# Patient Record
Sex: FEMALE | Race: ASIAN | Employment: FULL TIME | URBAN - METROPOLITAN AREA
[De-identification: names, ages, dates, MRNs, and addresses within clinical notes are randomized per-mention and may not be internally consistent; named-entity substitution may affect disease eponyms.]

---

## 2020-07-07 ENCOUNTER — TRANSFERRED RECORDS (OUTPATIENT)
Dept: HEALTH INFORMATION MANAGEMENT | Facility: CLINIC | Age: 25
End: 2020-07-07

## 2020-07-07 ENCOUNTER — MEDICAL CORRESPONDENCE (OUTPATIENT)
Dept: HEALTH INFORMATION MANAGEMENT | Facility: CLINIC | Age: 25
End: 2020-07-07

## 2020-07-08 ENCOUNTER — ANCILLARY PROCEDURE (OUTPATIENT)
Dept: CARDIOLOGY | Facility: CLINIC | Age: 25
End: 2020-07-08
Attending: FAMILY MEDICINE
Payer: COMMERCIAL

## 2020-07-08 DIAGNOSIS — R00.2 PALPITATIONS: ICD-10-CM

## 2020-07-08 DIAGNOSIS — R07.9 CHEST PAIN, UNSPECIFIED TYPE: ICD-10-CM

## 2020-07-08 PROCEDURE — 93226 XTRNL ECG REC<48 HR SCAN A/R: CPT | Mod: ZF

## 2020-07-08 PROCEDURE — 93227 XTRNL ECG REC<48 HR R&I: CPT | Mod: ZP | Performed by: INTERNAL MEDICINE

## 2020-07-08 NOTE — PROGRESS NOTES
Per Dr. Darek Salgado, patient to have Holter monitor placed.  Diagnosis: Palpitations R00.2  Monitor placed: Yes  Patient Instructed: Yes  Patient verbalized understanding: Yes  Holter # 3    Placed by: Radha Goddard CMA

## 2020-09-22 ENCOUNTER — TRANSFERRED RECORDS (OUTPATIENT)
Dept: HEALTH INFORMATION MANAGEMENT | Facility: CLINIC | Age: 25
End: 2020-09-22

## 2020-11-19 ENCOUNTER — TRANSFERRED RECORDS (OUTPATIENT)
Dept: HEALTH INFORMATION MANAGEMENT | Facility: CLINIC | Age: 25
End: 2020-11-19

## 2021-01-04 ENCOUNTER — HEALTH MAINTENANCE LETTER (OUTPATIENT)
Age: 26
End: 2021-01-04

## 2021-02-09 ENCOUNTER — TRANSFERRED RECORDS (OUTPATIENT)
Dept: HEALTH INFORMATION MANAGEMENT | Facility: CLINIC | Age: 26
End: 2021-02-09

## 2021-02-23 NOTE — TELEPHONE ENCOUNTER
REFERRAL INFORMATION:    Referring Provider:  N/A    Referring Clinic:  N/A    Reason for Visit/Diagnosis: Bloating     FUTURE VISIT INFORMATION:    Appointment Date: 3/22/2021    Appointment Time: 9 AM      NOTES STATUS DETAILS   OFFICE NOTE from Referring Provider N/A    OFFICE NOTE from Other Specialist Received 2/9/2021 Office visit with Dr. Amy Oswald ([facility])     11/19/2020 Office visit with Dr. Angelica Brannon ([facility])     6/25/2020 Office visit with Dr. Angelica Andrew ([facility])    HOSPITAL DISCHARGE SUMMARY/  ED VISITS N/A    OPERATIVE REPORT N/A    MEDICATION LIST N/A         ENDOSCOPY  N/A    COLONOSCOPY N/A    ERCP N/A    EUS N/A    STOOL TESTING Received  9/22/2020 ([facility])    PERTINENT LABS Received     PATHOLOGY REPORTS (RELATED) N/A    IMAGING (CT, MRI, EGD, MRCP, Small Bowel Follow Through/SBT, MR/CT Enterography) N/A      3/11/2021 3:07pm Called Pt, no answer. Unable to LVM because voicemail box has not been set up yet. Fax request sent to [employer] ([phone]) for med recs relating to Dx. -Lauren     3/11/2021 3:57pm Received recs from [facility]; sent to scan. Krystal

## 2021-03-22 ENCOUNTER — PRE VISIT (OUTPATIENT)
Dept: GASTROENTEROLOGY | Facility: CLINIC | Age: 26
End: 2021-03-22

## 2021-08-09 ENCOUNTER — IMMUNIZATION (OUTPATIENT)
Dept: NURSING | Facility: CLINIC | Age: 26
End: 2021-08-09
Payer: COMMERCIAL

## 2021-08-09 PROCEDURE — 0001A PR COVID VAC PFIZER DIL RECON 30 MCG/0.3 ML IM: CPT | Performed by: FAMILY MEDICINE

## 2021-08-09 PROCEDURE — 91300 PR COVID VAC PFIZER DIL RECON 30 MCG/0.3 ML IM: CPT | Performed by: FAMILY MEDICINE

## 2021-08-30 ENCOUNTER — IMMUNIZATION (OUTPATIENT)
Dept: NURSING | Facility: CLINIC | Age: 26
End: 2021-08-30
Attending: FAMILY MEDICINE
Payer: COMMERCIAL

## 2021-08-30 PROCEDURE — 0002A PR COVID VAC PFIZER DIL RECON 30 MCG/0.3 ML IM: CPT | Performed by: FAMILY MEDICINE

## 2021-08-30 PROCEDURE — 91300 PR COVID VAC PFIZER DIL RECON 30 MCG/0.3 ML IM: CPT | Performed by: FAMILY MEDICINE

## 2021-10-11 ENCOUNTER — HEALTH MAINTENANCE LETTER (OUTPATIENT)
Age: 26
End: 2021-10-11

## 2022-01-30 ENCOUNTER — HEALTH MAINTENANCE LETTER (OUTPATIENT)
Age: 27
End: 2022-01-30

## 2022-09-24 ENCOUNTER — HEALTH MAINTENANCE LETTER (OUTPATIENT)
Age: 27
End: 2022-09-24

## 2023-05-08 ENCOUNTER — HEALTH MAINTENANCE LETTER (OUTPATIENT)
Age: 28
End: 2023-05-08

## 2024-02-16 ENCOUNTER — TRANSCRIBE ORDERS (OUTPATIENT)
Dept: OTHER | Age: 29
End: 2024-02-16

## 2024-02-16 DIAGNOSIS — H01.009 BLEPHARITIS: Primary | ICD-10-CM

## 2024-05-09 ENCOUNTER — TRANSFERRED RECORDS (OUTPATIENT)
Dept: HEALTH INFORMATION MANAGEMENT | Facility: CLINIC | Age: 29
End: 2024-05-09

## 2024-06-13 ENCOUNTER — TRANSFERRED RECORDS (OUTPATIENT)
Dept: HEALTH INFORMATION MANAGEMENT | Facility: CLINIC | Age: 29
End: 2024-06-13

## 2024-07-14 ENCOUNTER — HEALTH MAINTENANCE LETTER (OUTPATIENT)
Age: 29
End: 2024-07-14

## 2024-08-01 ENCOUNTER — TRANSFERRED RECORDS (OUTPATIENT)
Dept: HEALTH INFORMATION MANAGEMENT | Facility: CLINIC | Age: 29
End: 2024-08-01
Payer: COMMERCIAL

## 2024-08-02 ENCOUNTER — MEDICAL CORRESPONDENCE (OUTPATIENT)
Dept: HEALTH INFORMATION MANAGEMENT | Facility: CLINIC | Age: 29
End: 2024-08-02
Payer: COMMERCIAL

## 2024-08-05 ENCOUNTER — TRANSCRIBE ORDERS (OUTPATIENT)
Dept: OTHER | Age: 29
End: 2024-08-05

## 2024-08-05 DIAGNOSIS — H01.139 ECZEMATOUS DERMATITIS OF EYELID: Primary | ICD-10-CM

## 2024-09-30 ENCOUNTER — OFFICE VISIT (OUTPATIENT)
Dept: DERMATOLOGY | Facility: CLINIC | Age: 29
End: 2024-09-30
Payer: COMMERCIAL

## 2024-09-30 DIAGNOSIS — H01.119 CONTACT DERMATITIS OF EYELID, UNSPECIFIED LATERALITY: Primary | ICD-10-CM

## 2024-09-30 DIAGNOSIS — L20.84 INTRINSIC ATOPIC DERMATITIS: ICD-10-CM

## 2024-09-30 RX ORDER — TACROLIMUS 1 MG/G
OINTMENT TOPICAL 2 TIMES DAILY
Qty: 60 G | Refills: 11 | Status: SHIPPED | OUTPATIENT
Start: 2024-09-30

## 2024-09-30 NOTE — NURSING NOTE
Jorge Leblanc's goals for this visit include:   Chief Complaint   Patient presents with    Rash     Pt has rash on eyelids, has been about a year, light steroid use has help. Did use oral steroid as well.        She requests these members of her care team be copied on today's visit information:     PCP: No Ref-Primary, Physician    Referring Provider:  [place] [address]    There were no vitals taken for this visit.    Do you need any medication refills at today's visit?     Niesha Gonzalez LPN on 9/30/2024 at 9:17 AM

## 2024-09-30 NOTE — PROGRESS NOTES
[facility] Dermatology Note    Encounter Date: Sep 30, 2024    Dermatology Problem List:  1.  Eyelid dermatitis  - current tx: tacrolimus  - referral to Dr. Ennis    ______________________________________    Impression/Plan:  1. Eyelid dermatitis: contact vs atopic; wears acrylic nails  - Referral to Dr. Ennis for allergy patch testing  - discussed risk/benefits of treatment: (topicals vs oral medication vs dupilumab)  - start antihistamine daily (allegra or fexofenadine)  - start tacrolimus ointment-apply twice a day  - in future may consider dupilumab vs oral mediations, if no improvement with topicals    Follow-up in 4 months.       Staff Involved:  Staff and Scribe    Scribe Disclosure:   I, Camilla Pro, am serving as a scribe; to document services personally performed by Dipak Murray MD -based on data collection and the provider's statements to me.     Provider Disclosure:   The documentation recorded by the scribe accurately reflects the services I personally performed and the decisions made by me.    Dipak Murray MD   of Dermatology  Department of Dermatology  [facility] [employer] [facility]        CC:   Chief Complaint   Patient presents with    Rash     Pt has rash on eyelids, has been about a year, light steroid use has help. Did use oral steroid as well.        History of Present Illness:  Ms. Jorge Leblanc is a 28 year old female who presents as a new patient.    Here for rash on eyelids which has been present for 1 year that comes and goes. It started out as swelling and then some scaling flakes. She has tried a light steroid, that has helped a little. She is wearing acrylic nails and she does wear makeup, which she feels may not cause it to get worse.     Labs:  N/a    Physical exam:  Vitals: There were no vitals taken for this visit.  GEN: This is a well developed, well-nourished female in no acute distress, in a pleasant mood.     SKIN: العراقي phototype II  - Focused examination of the face was performed.  - faint erythema of the lateral canthi   - No other lesions of concern on areas examined.     Past Medical History:   No past medical history on file.  No past surgical history on file.    Social History:   reports that she has never smoked. She has never used smokeless tobacco.    Family History:  No family history on file.    Medications:  Current Outpatient Medications   Medication Sig Dispense Refill    TOBRADEX 0.3-0.1 % ophthalmic ointment Place 1 Application into both eyes.      omeprazole (PRILOSEC) 20 MG DR capsule TAKE ONE Capsule BY MOUTH EVERY DAY BEFORE a meal       Allergies   Allergen Reactions    Septra [Sulfamethoxazole-Trimethoprim]

## 2024-09-30 NOTE — LETTER
9/30/2024      Jorge Leblanc  [address]  [place] [address]      Dear Colleague,    Thank you for referring your patient, Jorge Leblanc, to the [facility]. Please see a copy of my visit note below.    [facility] Dermatology Note    Encounter Date: Sep 30, 2024    Dermatology Problem List:  1.  Eyelid dermatitis  - current tx: tacrolimus  - referral to Dr. Ennis    ______________________________________    Impression/Plan:  1. Eyelid dermatitis: contact vs atopic; wears acrylic nails  - Referral to Dr. Ennis for allergy patch testing  - discussed risk/benefits of treatment: (topicals vs oral medication vs dupilumab)  - start antihistamine daily (allegra or fexofenadine)  - start tacrolimus ointment-apply twice a day  - in future may consider dupilumab vs oral mediations, if no improvement with topicals    Follow-up in 4 months.       Staff Involved:  Staff and Scribe    Scribe Disclosure:   I, Camilla Pro, am serving as a scribe; to document services personally performed by Dipak Murray MD -based on data collection and the provider's statements to me.     Provider Disclosure:   The documentation recorded by the scribe accurately reflects the services I personally performed and the decisions made by me.    Dipak Murray MD   of Dermatology  Department of Dermatology  [facility] [employer] [facility]        CC:   Chief Complaint   Patient presents with     Rash     Pt has rash on eyelids, has been about a year, light steroid use has help. Did use oral steroid as well.        History of Present Illness:  Ms. Jorge Leblanc is a 28 year old female who presents as a new patient.    Here for rash on eyelids which has been present for 1 year that comes and goes. It started out as swelling and then some scaling flakes. She has tried a light steroid, that has helped a little. She is wearing acrylic nails and she does wear makeup,  which she feels may not cause it to get worse.     Labs:  N/a    Physical exam:  Vitals: There were no vitals taken for this visit.  GEN: This is a well developed, well-nourished female in no acute distress, in a pleasant mood.    SKIN: العراقي phototype II  - Focused examination of the face was performed.  - faint erythema of the lateral canthi   - No other lesions of concern on areas examined.     Past Medical History:   No past medical history on file.  No past surgical history on file.    Social History:   reports that she has never smoked. She has never used smokeless tobacco.    Family History:  No family history on file.    Medications:  Current Outpatient Medications   Medication Sig Dispense Refill     TOBRADEX 0.3-0.1 % ophthalmic ointment Place 1 Application into both eyes.       omeprazole (PRILOSEC) 20 MG DR capsule TAKE ONE Capsule BY MOUTH EVERY DAY BEFORE a meal       Allergies   Allergen Reactions     Septra [Sulfamethoxazole-Trimethoprim]                     Again, thank you for allowing me to participate in the care of your patient.        Sincerely,        Dipak Murray MD

## 2024-12-26 NOTE — TELEPHONE ENCOUNTER
FUTURE VISIT INFORMATION      FUTURE VISIT INFORMATION:  Date: 3/19/25  Time: 10a  Location: [facility] [place] [place]  REFERRAL INFORMATION:  Referring provider:  Mj Deleon PA   Referring providers clinic:  [facility]   Reason for visit/diagnosis:  H01.139 (ICD-10-CM) - Eczematous dermatitis of eyelid     RECORDS REQUESTED FROM:       Clinic name Comments Records Status   [facility] 9/30/24 - Trevor MAYEN   [facility] 8/1/24 - Transferred Records, Outside  6/13/24 - Transferred Records, Outside Media   [facility] 5/9/24 - Transferred Records, Outside Media

## 2025-03-19 ENCOUNTER — OFFICE VISIT (OUTPATIENT)
Dept: ALLERGY | Facility: CLINIC | Age: 30
End: 2025-03-19
Payer: COMMERCIAL

## 2025-03-19 ENCOUNTER — PRE VISIT (OUTPATIENT)
Dept: ALLERGY | Facility: CLINIC | Age: 30
End: 2025-03-19

## 2025-03-19 DIAGNOSIS — Z88.2 ALLERGY TO SULFA DRUGS: ICD-10-CM

## 2025-03-19 DIAGNOSIS — R09.82 ALLERGIC RHINITIS WITH POSTNASAL DRIP: ICD-10-CM

## 2025-03-19 DIAGNOSIS — J30.81 AIRBORNE CAT ALLERGY: ICD-10-CM

## 2025-03-19 DIAGNOSIS — H01.119 EYELID DERMATITIS, ALLERGIC/CONTACT: ICD-10-CM

## 2025-03-19 DIAGNOSIS — Z88.9 DRUG ALLERGY: ICD-10-CM

## 2025-03-19 DIAGNOSIS — Z91.09 HOUSE DUST MITE ALLERGY: ICD-10-CM

## 2025-03-19 DIAGNOSIS — J30.9 ALLERGIC RHINITIS WITH POSTNASAL DRIP: ICD-10-CM

## 2025-03-19 DIAGNOSIS — L23.5 ALLERGIC DERMATITIS DUE TO OTHER CHEMICAL PRODUCT: ICD-10-CM

## 2025-03-19 DIAGNOSIS — L20.89 OTHER ATOPIC DERMATITIS: Primary | ICD-10-CM

## 2025-03-19 RX ORDER — PIMECROLIMUS 10 MG/G
CREAM TOPICAL 2 TIMES DAILY PRN
Qty: 30 G | Refills: 3 | Status: SHIPPED | OUTPATIENT
Start: 2025-03-19

## 2025-03-19 NOTE — PATIENT INSTRUCTIONS
You can always access your most recent office visit note with this allergy clinic via [facility]'s MyChart, which contains all of your results from testing performed by Dr. Ennis along with the details of the discussed treatment plan.  If you do not have access to MyChart, please discuss with a [facility] staff member. Additionally, if your provider is within [facility] or their EMR participates in the [facility]) network, they can also access this information.     ____________________________________________     House Dust Mite Allergy        The house dust mite is an arachnid about 0.3 mm in size and not visible to the naked eye. There are around 150 species of house dust mites in the world. One mite produces up to 40 fecal droppings a day. One teaspoonful of bedroom dust contains an average of nearly 1000 mites and 250,000 minute droppings.    Causes and triggers of house dust mite allergy  The house dust mite requires a warm, moist environment without light in order to live and reproduce. Our beds are ideal. The mite feeds on human and animal skin scales. The allergen is mainly contained in the mite's feces. The feces contain allergy-triggering constituents which are spread in fine dust, are breathed in and can cause an allergic reaction.    Symptoms  When the allergens come into contact with the mucous membranes in the eyes, nose, mouth and throat, sufferers develop symptoms typical of an allergic cold (allergic rhinitis) or an allergic inflammation of the conjunctiva (allergic conjunctivitis): blocked or runny nose, sneezing, red, itchy eyes. If all of these symptoms are present, then the condition is also known as rhinoconjunctivitis. Often, the upper respiratory tract becomes chronically inflamed, primarily because house dust mites are present all year round.  The symptoms of house dust mite allergy typically occur in the morning and are more frequent in the cold months of the year.    Therapy and  "treatment  As a first step, mattress, pillows and duvet/comforter should be placed in mite-proof or anti-mite covers, sometimes known as encasings. Alternatively you can use pillows or comforter that can be washed at over 130 F monthly. At the same time, house dust should be minimized. If necessary, the symptoms can be treated with medication, for example antihistamines in the form of nasal sprays, eye drops and tablets. Desensitization/specific immunotherapy (SIT) is recommended for house dust allergy if all the measures above are not sufficient.    Tips and tricks:  Keep room temperature at 66-70 F and relative air humidity at a maximum of 50%.  Ideally, thoroughly air your home two to three times a day for 5 to 10 minutes each time.  Wash bed linens in at least 130 F every week.  Remove stuffed animals or freeze them every other week.  Keep ceiling fans off in the bedroom as they can stir up dust mite allergens.  Remove dust from furniture with a damp cloth and regularly wet mop floors.  Do not put pot and hydroponic plants in the bedroom and also avoid putting too many in living areas, as they increase room humidity.  When staying overnight in other accommodations, we recommend taking your own bed linen and the above anti-mite mattress covers with you.  Remove upholstered furniture from the bedroom and consider removing the carpets. Ideally, use sealed parquet or laminate rossana, cork tiles or rossana made of wood, novilon or PVC.  Maybe additionally reduce dust mites in mattress, upholstery, or rossana using hot steam .      Modified from \"House Dust Mite Allergy\" by [employer] [facility] [place].   "

## 2025-03-19 NOTE — LETTER
3/19/2025      Jorge Leblanc  [address]  [facility] [address]      Dear Colleague,    Thank you for referring your patient, Jorge Leblanc, to the [facility] [place]. Please see a copy of my visit note below.    [facility] Dermato-allergology Note  Office visit  Encounter Date: Mar 19, 2025  ____________________________________________    CC: No chief complaint on file.      HPI:  (Mar 19, 2025)  Ms. Jorge Leblanc is a(n) 29 year old female who presents today as new patient for allergy consultation  - Referred by Mj ERICKSON ([facility]) on 8/5/2024 for eczematous dermatitis of eyelid   - Scanned [facility] records show drug allergy: Septra - rash, and pertinent MHx of anxiety  - No prior allergy records in [facility]  - 5/9/2024: visit with Dr. Angelica Franco ([facility])  FROM HPI:   Patient states that a new rash occurred 6 months ago. First starting around her left eye and then it started around her right eye. The rash consists of swelling, flaking, red, and had slightly yellow discharge. Patient saw her primary care physician in January 2024. During this appointment she was prescribed an antibiotic ointment which did not work. She saw her opthalmologist in February 2024 who prescribed TobraDex ointment three times a day for two weeks. This helped but never resolved the rash. The rash was then treated with a 5 day Prednisone taper in March 2024 which helped but the rash then recurred. She notices that products with salicylic acid make it worse. She will be traveling on vacation to [place] in one weeks, for 2 weeks.  The patient presents today for further evaluation and treatment.  FROM A&P:  1. Contact Dermatitis Other - The patient is taking a trip to [place] in one week and will be gone for 2 weeks. She will treat her eyelid dermatitis with the Desonide ointment starting in one week, and will follow up in 3 weeks from  today. We discussed that traveling and flying could make her dermatitis worse. We also discussed that environmental factors will cause her dermatitis to flare and many of these things are difficult to avoid. If this looks like it will be ongoing for a while, consider topical non- steroidal ointments. We will not initiate treatment for her acne until the eyelid dermatitis has cleared, as topical acne treatments can precipitate the dermatitis.  (L23.89)  Red, swollen, erythematous patchs  distributed on the eyelids.  Status: Inadequately Controlled    Plan: Treatment Regimen.  Begin the following treatment(s): Apply Desonide ointment TID for 7 days all the way around both eyes.  Discontinue the following treatment(s): Eyeshadow with shimmer, retinol, retin a, Tretinoin, salicylic acid, benzoyl peroxide, anything with anti-aging, sunscreen, blowing wind, flying on an airplane, and lash growth products.    Plan: Prescription.  desonide 0.05% topical ointment TP  Sig: Apply to inferior and superior eyelids TID for 7 days, then Dc.  Quantity: 15 Gram    Plan: Counseling.  I counseled the patient regarding the following:  Skin care: Patient should use hypoallergenic products such as unscented soaps. Eliminate exposure to all new cosmetics, fragrances, hair products, nail products shampoos, scented soaps, plants, metals and sunscreens.  Expectations: Contact dermatitis can persist for several weeks before it fully resolves. Sometimes, patch testing is necessary if reactions persist or if the patient is in contact with several potential allergens.  Contact office if: Contact dermatitis worsens or fails to improve despite several weeks of treatment.  - 8/1/24: Visit with referring provider (most recent [facility] visit in [facility])      - 9/30/24: Dr. Dipak Murray (derm) - initial visit with [facility] derm   FROM [facility]:   Here for rash on eyelids which has been present for 1 year that comes and goes. It started out as  swelling and then some scaling flakes. She has tried a light steroid, that has helped a little. She is wearing acrylic nails and she does wear makeup, which she feels may not cause it to get worse.     FROM PE:   Vitals: There were no vitals taken for this visit.  GEN: This is a well developed, well-nourished female in no acute distress, in a pleasant mood.    SKIN: العراقي phototype II  - Focused examination of the face was performed.  - faint erythema of the lateral canthi   - No other lesions of concern on areas examined.     FROM A&P:  Impression/Plan:  1. Eyelid dermatitis: contact vs atopic; wears acrylic nails  - Referral to Dr. Ennis for allergy patch testing  - discussed risk/benefits of treatment: (topicals vs oral medication vs dupilumab)  - start antihistamine daily (allegra or fexofenadine)  - start tacrolimus ointment-apply twice a day  - in future may consider dupilumab vs oral mediations, if no improvement with topicals     Follow-up in 4 months.   - She did dye her hair in January   - Otherwise feeling well in usual state of health    Physical Exam:  General: In no acute distress, well-developed, well-nourished  Eyes: no conjunctivitis  ENT: no signs of rhinitis   Pulmonary: no wheezing or coughing  Skin: Focused examination of the skin on test sites was performed = see test results below  - No active eczematous lesions on visible skin, including eyelids and face     Past Medical History:   There is no problem list on file for this patient.    No past medical history on file.    Allergies:  Allergies   Allergen Reactions     Septra [Sulfamethoxazole-Trimethoprim]        Medications:  Current Outpatient Medications   Medication Sig Dispense Refill     omeprazole (PRILOSEC) 20 MG DR capsule TAKE ONE Capsule BY MOUTH EVERY DAY BEFORE a meal       tacrolimus (PROTOPIC) 0.1 % external ointment Apply topically 2 times daily. 60 g 11     TOBRADEX 0.3-0.1 % ophthalmic ointment Place 1 Application  into both eyes.       No current facility-administered medications for this visit.       Previous Labs, Allergy Tests, Dermatopathology, Imaging:  See HPI    Referred By: DRE Flannery  [place] [facility]  [address]  [place] [address]     Allergy Tests:  Past Allergy Test    Family History:  No family history on file.    Social History:  The patient works from home, minimal exposure to dust.   Patient has the following hobbies or non-occupational exposure: .    Order for Future Allergy Testing:    [x] Outpatient  [] Inpatient: [facility]..../ Bed ...    Skin Atopy (atopic dermatitis)? [x] Yes     [] No  Comments:   - recurrent dermatitis of right eye, usually at the start of late fall   - first noticed in October 2023  - was worse last year September 2024  - does not worsen in the winter   - eczema on shins but otherwise none  - started treating area with TobraDex in April 2024    Childhood eczema?   [] Yes     [x] No  Comments:   Hand eczema?   [] Yes     [x] No  If yes, leading hand? [] Right     [] Left     [] Ambidextrous  Comments:   - goes to the nail salon every few weeks    Contact allergies?     Comments:   Including adhesives/bandages? [] Yes     [x] No  Comments:   Including metals?   [] Yes     [x] No  Comments:   Other substances?   [] Yes     [x] No  Comments:     Drug allergies?   [x] Yes     [] No  Comments:   -  after first dose of Septra              Angioedema?   [] Yes     [x] No  Comments:     Urticaria?   [] Yes     [x] No  Comments:     Food allergies?   [] Yes     [x] No  Comments:     Pet allergies?   [] Yes     [x] No  Comments:     Environmental allergy symptoms?  [] Conjunctivitis  [] Otitis  [] Pharyngitis  [] Polyposis  [x] Postnasal Drip  [x] Rhinitis  [] Sinusitis  [] None  Comments:   - runny nose, sneezing  - can happen at anytime during the year  - started within 1-4 weeks of moving to [place] from [place]  - not currently taking medications    TRIIN  Operations?  [] Adenoids [] Septum [] Sinus  [] Tonsils        [] Other:   [x] None  Comments:     Pulmonary symptoms (from birth to present)?  [] Asthma bronchiale  Inhaler(s)?:   [] Coughing  [] Other  [x] None  Comments:     Environmental and pulmonary symptoms aggravated by?  Season: [] None     [] I     [] II     [] III     [] IV     [] V     [] VI          [] VII     [] VIII     [] IX     [] X     [] XI     [] XII     [x] Perennial  Time of Day: [] None     [] Morning     [] Noon     [] Evening     [] Night     [] Whole Day  Location/Changes: [] None     [] Inside     [] Outside     [] Mountain     [] Sea     [] Other:   Triggers (specific): [] None     [] Animals     [] Dust     [] Mold     [] Plants     [] Other:   Triggers (other): [] None     [] Psyche     [] Sport     [] Work     [] Other:   Irritant: [] None     [] Cold     [] Heat     [] Odors     [] Physical Efforts     [] Smoke     [] Other:     Order for PATCH TESTS  Reason for tests (suspected allergy): not necessary at the moment  Known previous allergies: see questionnaire and HPI  Standardized panels  [x] Standard panel (40 tests)  [x] Preservatives & Antimicrobials (31 tests)  [x] Emulsifiers & Additives (25 tests)   [x] Perfumes/Flavours & Plants (25 tests)  [x] Hairdresser panel (12 tests)  [] Rubber Chemicals (22 tests)  [x] Plastics (26 tests)  [] Colorants/Dyes/Food additives (20 tests)  [] Metals (implants/dental) (24 tests)  [] Local anaesthetics/NSAIDs (13 tests)  [x] Antibiotics & Antimycotics (14 tests)   [] Corticosteroids (15 tests)   [] Photopatch test (62 tests)   [] Others:     [] Patient's Own Products:   DO NOT test if chemical or biological identity is unknown!   always ask from patient the product information and safety sheets (MSDS)       Order for PRICK TESTS    Reason for tests (suspected allergy): atopy?  Known previous allergies: none confirmed    Standardized prick panels  [x] Atopic panel (20 tests)  [] Pediatric Panel  (12 tests)  [] Milk, Meat, Eggs, Grains (20 tests)   [] Dust, Epithelia, Feathers (10 tests)  [] Fish, Seafood, Shellfish (17 tests)  [] Nuts, Beans (14 tests)  [] Spice, Vegetable, Fruit (17 tests)  [] Pollen Panel = Tree, Grass, Weed (24 tests)  [] Others:     [] Patient's Own Products:   DO NOT test if chemical or biological identity is unknown!   always ask from patient the product information and safety sheets (MSDS)     Standardized intradermal tests  [x] Alternaria alternata  [x] Aspergillus fumigatus  [x] Cladosporium herbarum   [x] Penicillium notatum  [x] Dermatophagoides farinae  [x] Dermatophagoides pteronyssinus  [] Dog Epithelium  [] Cat Epithelium  [] Others:     [] Bee venom   [] Wasp venom  !!Specific protocol with dilutions!!       Order for Drug allergy tests (prick & intradermal & patch tests)    [] Penicillin G     [] Ampicillin   [] Cefazolin        [] Ceftriaxone     [] Ceftazidime     [] Cefepime     [] Cefuroxime  [] Bactrim  [] Iodixanol     [] Iopamidol        [] Iohexol  [] Others:   [] Patient's Own:   Order for  as test date    ____________________________________________    Atopy Screen (Placed Mar 19, 2025)  No Substance Readings (15 min) Evaluation   POS Histamine 1mg/ml ++    NEG NaCl 0.9% -      No Substance Readings (15 min) Evaluation   1 Alternaria alternata (tenuis)  -    2 Cladosporium herbarum -    3 Aspergillus fumigatus -    4 Penicillium notatum -    5 Dermatophagoides pteronyssinus ++++    6 Dermatophagoides farinae ++++    7 Dog epithelium (canis spp) -    8 Cat hair (rupert catus) +    9 Cockroach   (Blatella americana & germanica) -    10 Grass mix midwest   (Lorraine, Orchard, Redtop, Aric) -    11 Surendra grass (sorghum halepense) -    12 Weed mix   (common Cocklebur, Lamb s quarters, rough redroot Pigweed, Dock/Sorrel) -    13 Mug wort (artemisia vulgare) -    14 Ragweed giant/short (ambrosia spp) -    15 White birch (Betula papyrifera) -    16 Tree mix 1 (Pecan,  Maple BHR, Grandin RVW, american [place], black Chilo) -    17 Red cedar (juniperus virginia) -    18 Tree mix 2   (white Gutierrez, river/red Birch, black [place], common [place], american Elm) -    19 Box elder/Maple mix (acer spp) -    20 Scott shagbark (carya ovata) -           Conclusion      Intradermal Testing (Placed Mar 19, 2025)  No Substance Conc.  Reading (15min)  immediate Papule [mm] / Erythema [mm] Reading   (... days)  delayed Papule [mm] / Erythema [mm] Remarks   A Aspergillus fumigatus  1:10 -   -    P Penicillium notatum 1:10 -   -     Alternaria Alternata 1:10 -   -     Aspergillus fumigatus 1:10 -   -    Conclusions: No signs for immediate-type reaction. Over the next 2 and 4 days, we will check for delayed-type reactions..    ________________________________    Assessment & Plan:    ==> Final Diagnosis:     # Recurrent eyelid dermatitis (only right) in fall in [place]  Atopic dermatitis/reaction to snow molds?  Allergic contact dermatitis?  * chronic illness with exacerbation, progression, side effects from treatment    #Suspicion for atopic predisposition with:  Perennial PND and rhinitis   Seasonal eyelid atopic dermatitis?  * chronic illness with exacerbation, progression, side effects from treatment     # Ruling out immediate-type reaction to Bactrim  * acute illness with systemic symptoms      These conclusions are made at the best of one's knowledge and belief based on the provided evidence such as patient's history and allergy test results and they can change over time or can be incomplete because of missing information.    ==> Treatment Plan:    Reduce DM  Prescribed Elidel cream for dermatitis around eyes, use twice a day when she has it  Zyrtec or Claritin for nasal congestion, at bedtime      Procedures Performed: Allergy tests, including prick and intradermal     Staff Involved: Provider, Staff, Medical Student, and Scribe    Scribe Disclosure:   Tammie BRITO, am serving as a  scribe to document services personally performed by Mane Ennis MD based on data collection and the provider's statements to me.     Staff Physician Comments:  I was present with the scribe who participated in the documentation of the note. I have verified the history and personally performed the physical exam and medical decision making. I agree with the assessment and plan as documented in the note. I have reviewed and if necessary amended the note.      Also, I was present with the medical student who participated in the service and in the documentation of the note. I have verified the history and personally performed the physical exam and medical decision making. I agree with the assessment and plan as documented in the note. I have reviewed and if necessary amended the note.    Mane Ennis MD  Professor  Head of Dermato-Allergy Division  Department of Dermatology  [facility]     Follow-up in Derm-Allergy clinic for patch tests if necessary    I spent a total of 40 minutes with Jorge Deana. This time was spent counseling the patient and/or coordinating care, explaining the allergy tests, performing allergy tests and assessing the clinical relevance.       Again, thank you for allowing me to participate in the care of your patient.        Sincerely,        Mane Ennis MD    Electronically signed

## 2025-03-19 NOTE — PROGRESS NOTES
[facility] Dermato-allergology Note  Office visit  Encounter Date: Mar 19, 2025  ____________________________________________    CC: No chief complaint on file.      HPI:  (Mar 19, 2025)  Ms. Jorge Leblanc is a(n) 29 year old female who presents today as new patient for allergy consultation  - Referred by Mj ERICKSON ([facility]) on 8/5/2024 for eczematous dermatitis of eyelid   - Scanned [facility] records show drug allergy: Septra - rash, and pertinent MHx of anxiety  - No prior allergy records in [facility]  - 5/9/2024: visit with Dr. Angelica Franco ([facility])  FROM HPI:   Patient states that a new rash occurred 6 months ago. First starting around her left eye and then it started around her right eye. The rash consists of swelling, flaking, red, and had slightly yellow discharge. Patient saw her primary care physician in January 2024. During this appointment she was prescribed an antibiotic ointment which did not work. She saw her opthalmologist in February 2024 who prescribed TobraDex ointment three times a day for two weeks. This helped but never resolved the rash. The rash was then treated with a 5 day Prednisone taper in March 2024 which helped but the rash then recurred. She notices that products with salicylic acid make it worse. She will be traveling on vacation to [facility] in one weeks, for 2 weeks.  The patient presents today for further evaluation and treatment.  FROM A&P:  1. Contact Dermatitis Other - The patient is taking a trip to [facility] in one week and will be gone for 2 weeks. She will treat her eyelid dermatitis with the Desonide ointment starting in one week, and will follow up in 3 weeks from today. We discussed that traveling and flying could make her dermatitis worse. We also discussed that environmental factors will cause her dermatitis to flare and many of these things are difficult to avoid. If this looks like it will be  ongoing for a while, consider topical non- steroidal ointments. We will not initiate treatment for her acne until the eyelid dermatitis has cleared, as topical acne treatments can precipitate the dermatitis.  (L23.89)  Red, swollen, erythematous patchs  distributed on the eyelids.  Status: Inadequately Controlled    Plan: Treatment Regimen.  Begin the following treatment(s): Apply Desonide ointment TID for 7 days all the way around both eyes.  Discontinue the following treatment(s): Eyeshadow with shimmer, retinol, retin a, Tretinoin, salicylic acid, benzoyl peroxide, anything with anti-aging, sunscreen, blowing wind, flying on an airplane, and lash growth products.    Plan: Prescription.  desonide 0.05% topical ointment TP  Sig: Apply to inferior and superior eyelids TID for 7 days, then Dc.  Quantity: 15 Gram    Plan: Counseling.  I counseled the patient regarding the following:  Skin care: Patient should use hypoallergenic products such as unscented soaps. Eliminate exposure to all new cosmetics, fragrances, hair products, nail products shampoos, scented soaps, plants, metals and sunscreens.  Expectations: Contact dermatitis can persist for several weeks before it fully resolves. Sometimes, patch testing is necessary if reactions persist or if the patient is in contact with several potential allergens.  Contact office if: Contact dermatitis worsens or fails to improve despite several weeks of treatment.  - 8/1/24: Visit with referring provider (most recent [facility] visit in [facility])      - 9/30/24: Dr. Dipak Murray (derm) - initial visit with [facility] derm   FROM [facility]:   Here for rash on eyelids which has been present for 1 year that comes and goes. It started out as swelling and then some scaling flakes. She has tried a light steroid, that has helped a little. She is wearing acrylic nails and she does wear makeup, which she feels may not cause it to get worse.     FROM PE:   Vitals: There were no vitals  taken for this visit.  GEN: This is a well developed, well-nourished female in no acute distress, in a pleasant mood.    SKIN: العراقي phototype II  - Focused examination of the face was performed.  - faint erythema of the lateral canthi   - No other lesions of concern on areas examined.     FROM A&P:  Impression/Plan:  1. Eyelid dermatitis: contact vs atopic; wears acrylic nails  - Referral to Dr. Ennis for allergy patch testing  - discussed risk/benefits of treatment: (topicals vs oral medication vs dupilumab)  - start antihistamine daily (allegra or fexofenadine)  - start tacrolimus ointment-apply twice a day  - in future may consider dupilumab vs oral mediations, if no improvement with topicals     Follow-up in 4 months.   - She did dye her hair in January   - Otherwise feeling well in usual state of health    Physical Exam:  General: In no acute distress, well-developed, well-nourished  Eyes: no conjunctivitis  ENT: no signs of rhinitis   Pulmonary: no wheezing or coughing  Skin: Focused examination of the skin on test sites was performed = see test results below  - No active eczematous lesions on visible skin, including eyelids and face     Past Medical History:   There is no problem list on file for this patient.    No past medical history on file.    Allergies:  Allergies   Allergen Reactions    Septra [Sulfamethoxazole-Trimethoprim]        Medications:  Current Outpatient Medications   Medication Sig Dispense Refill    omeprazole (PRILOSEC) 20 MG DR capsule TAKE ONE Capsule BY MOUTH EVERY DAY BEFORE a meal      tacrolimus (PROTOPIC) 0.1 % external ointment Apply topically 2 times daily. 60 g 11    TOBRADEX 0.3-0.1 % ophthalmic ointment Place 1 Application into both eyes.       No current facility-administered medications for this visit.       Previous Labs, Allergy Tests, Dermatopathology, Imaging:  See HPI    Referred By: DRE Flannery  [facility]  [address] [facility]  [address]  [place] [address]     Allergy Tests:  Past Allergy Test    Family History:  No family history on file.    Social History:  The patient works from home, minimal exposure to dust.   Patient has the following hobbies or non-occupational exposure: .    Order for Future Allergy Testing:    [x] Outpatient  [] Inpatient: [facility]..../ Bed ...    Skin Atopy (atopic dermatitis)? [x] Yes     [] No  Comments:   - recurrent dermatitis of right eye, usually at the start of late fall   - first noticed in October 2023  - was worse last year September 2024  - does not worsen in the winter   - eczema on shins but otherwise none  - started treating area with TobraDex in April 2024    Childhood eczema?   [] Yes     [x] No  Comments:   Hand eczema?   [] Yes     [x] No  If yes, leading hand? [] Right     [] Left     [] Ambidextrous  Comments:   - goes to the nail salon every few weeks    Contact allergies?     Comments:   Including adhesives/bandages? [] Yes     [x] No  Comments:   Including metals?   [] Yes     [x] No  Comments:   Other substances?   [] Yes     [x] No  Comments:     Drug allergies?   [x] Yes     [] No  Comments:   -  after first dose of Septra              Angioedema?   [] Yes     [x] No  Comments:     Urticaria?   [] Yes     [x] No  Comments:     Food allergies?   [] Yes     [x] No  Comments:     Pet allergies?   [] Yes     [x] No  Comments:     Environmental allergy symptoms?  [] Conjunctivitis  [] Otitis  [] Pharyngitis  [] Polyposis  [x] Postnasal Drip  [x] Rhinitis  [] Sinusitis  [] None  Comments:   - runny nose, sneezing  - can happen at anytime during the year  - started within 1-4 weeks of moving to [place] from [place]  - not currently taking medications    HENT Operations?  [] Adenoids [] Septum [] Sinus  [] Tonsils        [] Other:   [x] None  Comments:     Pulmonary symptoms (from birth to present)?  [] Asthma bronchiale  Inhaler(s)?:   [] Coughing  [] Other  [x] None  Comments:     Environmental and  pulmonary symptoms aggravated by?  Season: [] None     [] I     [] II     [] III     [] IV     [] V     [] VI          [] VII     [] VIII     [] IX     [] X     [] XI     [] XII     [x] Perennial  Time of Day: [] None     [] Morning     [] Noon     [] Evening     [] Night     [] Whole Day  Location/Changes: [] None     [] Inside     [] Outside     [] Mountain     [] Sea     [] Other:   Triggers (specific): [] None     [] Animals     [] Dust     [] Mold     [] Plants     [] Other:   Triggers (other): [] None     [] Psyche     [] Sport     [] Work     [] Other:   Irritant: [] None     [] Cold     [] Heat     [] Odors     [] Physical Efforts     [] Smoke     [] Other:     Order for PATCH TESTS  Reason for tests (suspected allergy): not necessary at the moment  Known previous allergies: see questionnaire and HPI  Standardized panels  [x] Standard panel (40 tests)  [x] Preservatives & Antimicrobials (31 tests)  [x] Emulsifiers & Additives (25 tests)   [x] Perfumes/Flavours & Plants (25 tests)  [x] Hairdresser panel (12 tests)  [] Rubber Chemicals (22 tests)  [x] Plastics (26 tests)  [] Colorants/Dyes/Food additives (20 tests)  [] Metals (implants/dental) (24 tests)  [] Local anaesthetics/NSAIDs (13 tests)  [x] Antibiotics & Antimycotics (14 tests)   [] Corticosteroids (15 tests)   [] Photopatch test (62 tests)   [] Others:     [] Patient's Own Products:   DO NOT test if chemical or biological identity is unknown!   always ask from patient the product information and safety sheets (MSDS)       Order for PRICK TESTS    Reason for tests (suspected allergy): atopy?  Known previous allergies: none confirmed    Standardized prick panels  [x] Atopic panel (20 tests)  [] Pediatric Panel (12 tests)  [] Milk, Meat, Eggs, Grains (20 tests)   [] Dust, Epithelia, Feathers (10 tests)  [] Fish, Seafood, Shellfish (17 tests)  [] Nuts, Beans (14 tests)  [] Spice, Vegetable, Fruit (17 tests)  [] Pollen Panel = Tree, Grass, Weed (24  tests)  [] Others:     [] Patient's Own Products:   DO NOT test if chemical or biological identity is unknown!   always ask from patient the product information and safety sheets (MSDS)     Standardized intradermal tests  [x] Alternaria alternata  [x] Aspergillus fumigatus  [x] Cladosporium herbarum   [x] Penicillium notatum  [x] Dermatophagoides farinae  [x] Dermatophagoides pteronyssinus  [] Dog Epithelium  [] Cat Epithelium  [] Others:     [] Bee venom   [] Wasp venom  !!Specific protocol with dilutions!!       Order for Drug allergy tests (prick & intradermal & patch tests)    [] Penicillin G     [] Ampicillin   [] Cefazolin        [] Ceftriaxone     [] Ceftazidime     [] Cefepime     [] Cefuroxime  [] Bactrim  [] Iodixanol     [] Iopamidol        [] Iohexol  [] Others:   [] Patient's Own:   Order for  as test date    ____________________________________________    Atopy Screen (Placed Mar 19, 2025)  No Substance Readings (15 min) Evaluation   POS Histamine 1mg/ml ++    NEG NaCl 0.9% -      No Substance Readings (15 min) Evaluation   1 Alternaria alternata (tenuis)  -    2 Cladosporium herbarum -    3 Aspergillus fumigatus -    4 Penicillium notatum -    5 Dermatophagoides pteronyssinus ++++    6 Dermatophagoides farinae ++++    7 Dog epithelium (canis spp) -    8 Cat hair (rupert catus) +    9 Cockroach   (Blatella americana & germanica) -    10 Grass mix midwest   (Lorraine, Orchard, Redtop, Aric) -    11 Surendra grass (sorghum halepense) -    12 Weed mix   (common Cocklebur, Lamb s quarters, rough redroot Pigweed, Dock/Sorrel) -    13 Mug wort (artemisia vulgare) -    14 Ragweed giant/short (ambrosia spp) -    15 White birch (Betula papyrifera) -    16 Tree mix 1 (Pecan, Maple BHR, Oak RVW, american [place], black Edison) -    17 Red cedar (juniperus virginia) -    18 Tree mix 2   (white Gutierrez, river/red Birch, black Melvin, common [place], american Elm) -    19 Box elder/Maple mix (acer spp) -    20 Hickory  flavia (carya ovata) -           Conclusion      Intradermal Testing (Placed Mar 19, 2025)  No Substance Conc.  Reading (15min)  immediate Papule [mm] / Erythema [mm] Reading   (... days)  delayed Papule [mm] / Erythema [mm] Remarks   A Aspergillus fumigatus  1:10 -   -    P Penicillium notatum 1:10 -   -     Alternaria Alternata 1:10 -   -     Aspergillus fumigatus 1:10 -   -    Conclusions: No signs for immediate-type reaction. Over the next 2 and 4 days, we will check for delayed-type reactions..    ________________________________    Assessment & Plan:    ==> Final Diagnosis:     # Recurrent eyelid dermatitis (only right) in fall in [place]  Atopic dermatitis/reaction to snow molds?  Allergic contact dermatitis?  * chronic illness with exacerbation, progression, side effects from treatment    #Suspicion for atopic predisposition with:  Perennial PND and rhinitis   Seasonal eyelid atopic dermatitis?  * chronic illness with exacerbation, progression, side effects from treatment     # Ruling out immediate-type reaction to Bactrim  * acute illness with systemic symptoms      These conclusions are made at the best of one's knowledge and belief based on the provided evidence such as patient's history and allergy test results and they can change over time or can be incomplete because of missing information.    ==> Treatment Plan:    Reduce DM  Prescribed Elidel cream for dermatitis around eyes, use twice a day when she has it  Zyrtec or Claritin for nasal congestion, at bedtime      Procedures Performed: Allergy tests, including prick and intradermal     Staff Involved: Provider, Staff, Medical Student, and Scribe    Scribe Disclosure:   I, Tammie Pizarro, am serving as a scribe to document services personally performed by Mane Ennis MD based on data collection and the provider's statements to me.     Staff Physician Comments:  I was present with the scribe who participated in the documentation of the  note. I have verified the history and personally performed the physical exam and medical decision making. I agree with the assessment and plan as documented in the note. I have reviewed and if necessary amended the note.      Also, I was present with the medical student who participated in the service and in the documentation of the note. I have verified the history and personally performed the physical exam and medical decision making. I agree with the assessment and plan as documented in the note. I have reviewed and if necessary amended the note.    Mane Ennis MD  Professor  Head of Dermato-Allergy Division  Department of Dermatology  [facility]     Follow-up in Derm-Allergy clinic for patch tests if necessary    I spent a total of 40 minutes with Jorge Leblanc. This time was spent counseling the patient and/or coordinating care, explaining the allergy tests, performing allergy tests and assessing the clinical relevance.

## 2025-07-19 ENCOUNTER — HEALTH MAINTENANCE LETTER (OUTPATIENT)
Age: 30
End: 2025-07-19